# Patient Record
Sex: MALE | Race: WHITE | NOT HISPANIC OR LATINO | URBAN - METROPOLITAN AREA
[De-identification: names, ages, dates, MRNs, and addresses within clinical notes are randomized per-mention and may not be internally consistent; named-entity substitution may affect disease eponyms.]

---

## 2024-04-29 PROBLEM — I10 ESSENTIAL HYPERTENSION: Status: ACTIVE | Noted: 2024-04-29

## 2024-04-29 PROBLEM — E78.5 DYSLIPIDEMIA (HIGH LDL; LOW HDL): Status: ACTIVE | Noted: 2024-04-29

## 2024-04-29 PROBLEM — R20.9 SENSATION ALTERATION, LATE EFFECT OF CEREBROVASCULAR DISEASE: Status: ACTIVE | Noted: 2024-04-29

## 2024-04-29 PROBLEM — R73.03 PREDIABETES: Status: ACTIVE | Noted: 2024-04-29

## 2024-04-29 PROBLEM — I69.998 SENSATION ALTERATION, LATE EFFECT OF CEREBROVASCULAR DISEASE: Status: ACTIVE | Noted: 2024-04-29

## 2024-05-02 PROBLEM — E78.5 DYSLIPIDEMIA: Status: ACTIVE | Noted: 2024-05-02

## 2024-05-02 PROBLEM — R73.03 PRE-DIABETES: Status: ACTIVE | Noted: 2024-05-02

## 2024-05-02 PROBLEM — Z86.73 HISTORY OF STROKE: Status: ACTIVE | Noted: 2024-05-02

## 2024-05-02 PROBLEM — Z87.891 FORMER CIGARETTE SMOKER: Status: ACTIVE | Noted: 2024-05-02

## 2024-05-02 PROBLEM — I65.22 STENOSIS OF LEFT CAROTID ARTERY: Status: ACTIVE | Noted: 2024-05-02

## 2024-05-02 PROBLEM — I10 PRIMARY HYPERTENSION: Status: ACTIVE | Noted: 2024-05-02

## 2024-06-13 ENCOUNTER — TELEMEDICINE (OUTPATIENT)
Dept: NEUROLOGY | Facility: CLINIC | Age: 69
End: 2024-06-13
Payer: COMMERCIAL

## 2024-06-13 DIAGNOSIS — I10 ESSENTIAL HYPERTENSION: ICD-10-CM

## 2024-06-13 DIAGNOSIS — I69.998 SENSATION ALTERATION, LATE EFFECT OF CEREBROVASCULAR DISEASE: Primary | ICD-10-CM

## 2024-06-13 DIAGNOSIS — R73.03 PREDIABETES: ICD-10-CM

## 2024-06-13 DIAGNOSIS — I65.22 STENOSIS OF LEFT CAROTID ARTERY: ICD-10-CM

## 2024-06-13 DIAGNOSIS — E78.5 DYSLIPIDEMIA (HIGH LDL; LOW HDL): ICD-10-CM

## 2024-06-13 DIAGNOSIS — R20.9 SENSATION ALTERATION, LATE EFFECT OF CEREBROVASCULAR DISEASE: Primary | ICD-10-CM

## 2024-06-13 PROBLEM — M47.816 LUMBAR SPONDYLOSIS: Status: ACTIVE | Noted: 2020-08-06

## 2024-06-13 PROBLEM — I63.9 CEREBRAL INFARCTION (MULTI): Status: ACTIVE | Noted: 2024-06-13

## 2024-06-13 PROCEDURE — 1036F TOBACCO NON-USER: CPT | Performed by: PSYCHIATRY & NEUROLOGY

## 2024-06-13 PROCEDURE — 99214 OFFICE O/P EST MOD 30 MIN: CPT | Mod: 95 | Performed by: PSYCHIATRY & NEUROLOGY

## 2024-06-13 PROCEDURE — 1159F MED LIST DOCD IN RCRD: CPT | Performed by: PSYCHIATRY & NEUROLOGY

## 2024-06-13 PROCEDURE — 1160F RVW MEDS BY RX/DR IN RCRD: CPT | Performed by: PSYCHIATRY & NEUROLOGY

## 2024-06-13 PROCEDURE — 99214 OFFICE O/P EST MOD 30 MIN: CPT | Performed by: PSYCHIATRY & NEUROLOGY

## 2024-06-13 RX ORDER — AMLODIPINE BESYLATE 5 MG/1
5 TABLET ORAL DAILY
COMMUNITY

## 2024-06-13 RX ORDER — PRAVASTATIN SODIUM 10 MG/1
1 TABLET ORAL
COMMUNITY
Start: 2024-05-20

## 2024-06-13 NOTE — LETTER
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     Sincerely,     Deana Lee MD      CC: No Recipients  ______________________________________________________________________________________       [facility]   Irving Avalos is a 69 y.o. year old male presenting for Virtual Visit for follow-up .   PCP Dr Simental     8/2021- ED evaluation for LUE/face numbness progressing in setting of hypertensive emergency /85. Initial CT negative, admitted with persistent sx MRI dx stroke. Medications adjusted and DC to home.   Followup with Vascular Surgery for LICA stenosis, thought to be on low-end of velocity criteria given US and CTA. Plan to followup with US.      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 No recurrent events. Compliant with Vascular risk factors- HTN- >200 on presentation- now 120-140 at home, HPL-  intolerant of some statin but significant dietary changes, pre- DM- A1c 6%- significant dietary changes and 20# weight loss with , regular alcohol- now 0-2 light beers not daily, remote smoker quit 1982. .     7/27/23- Stroke prevention clinic- needs updated letter for driving.   Today: no new events. Residual hand/ face numbness. Returned to piano. Since last visit, seen at [facility] in 10/2022 for asymptomatic HTN, medications adjusted.   Vascular risk factors- HTN- 130/60 range, HPL, pre-DM, remote smoking.  Placed order for followup carotid US.  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      6/13/24- reschedule of 5/2/24 noshow.  Needs letter for driving a bus.  No new stroke events with minor residual numbness R face/ hand, back to all house activities,  and had returned to piano but dexterity for complex pieces are not the same.   Recently established care with PCP at [facility]. Vascular risk factors- HTN with office BP  last month 120-140s.  CKD Cr 1.34, HPL-back on low-dose statin after earlier intolerance and baseline , pre-DM with recent nonfasting glucose 191, BMI >30, remote smoking.    Extensive review of notes in EMR, labs, tests, Interpretation of neuroimaging  2021- TTE-normal EF and valves.   8/28/21- CT no acute findngs, CTA- atherosclerotic changes < 50% carotid stenosis.   8/31/21- MRI DWI + for R thalamic and occipital infarcts, microangiopathic white matter changes, MRA intracranial- R PCA stenosis, Car US-atherosclerotic changes moderate LICA stenosis.     Relevant ROS, Problem list, Past Medical/ Surgical/ Family/ Social history- reviewed and pertinent details noted in history.     Objective    Visit Vitals  Smoking Status Former   Normal speech and language, normal facial strength and animation.  No UE drift or evident clumsiness on finger movements.        Assessment/Plan  1. Sensation alteration, late effect of cerebrovascular disease    2. Essential hypertension    3. Dyslipidemia (high LDL; low HDL)    4. Prediabetes    5. Stenosis of left carotid artery        PLAN   R thalamic, occipital infarct (9/2021) with RPCA intracranial stenosis. Residual minor sensory changes.   Plan: Continue aspirin for stroke prevention.   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     Vascular risk factors- HTN, HPL, pre- DM- A1c 6%, asymptomatic L carotid stenosis.   Followup with PCP for ongoing monitoring and management.  Recommmend q1-2yr surveillance car US for asymptomatic LICA stenosis.        No orders of the defined types were placed in this encounter.

## 2024-06-13 NOTE — PROGRESS NOTES
[facility]   Irving Avalos is a 69 y.o. year old male presenting for Virtual Visit for follow-up .   PCP Dr Simental     8/2021- ED evaluation for LUE/face numbness progressing in setting of hypertensive emergency /85. Initial CT negative, admitted with persistent sx MRI dx stroke. Medications adjusted and DC to home.   Followup with Vascular Surgery for LICA stenosis, thought to be on low-end of velocity criteria given US and CTA. Plan to followup with US.      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 No recurrent events. Compliant with Vascular risk factors- HTN- >200 on presentation- now 120-140 at home, HPL-  intolerant of some statin but significant dietary changes, pre- DM- A1c 6%- significant dietary changes and 20# weight loss with , regular alcohol- now 0-2 light beers not daily, remote smoker quit 1982. .     7/27/23- Stroke prevention clinic- needs updated letter for driving.   Today: no new events. Residual hand/ face numbness. Returned to piano. Since last visit, seen at [facility] in 10/2022 for asymptomatic HTN, medications adjusted.   Vascular risk factors- HTN- 130/60 range, HPL, pre-DM, remote smoking.  Placed order for followup carotid US.  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      6/13/24- reschedule of 5/2/24 noshow.  Needs letter for driving a bus.  No new stroke events with minor residual numbness R face/ hand, back to all house activities, and had returned to piano but dexterity for complex pieces are not the same.   Recently established care with PCP at [facility]. Vascular risk factors- HTN with office BP  last month 120-140s.  CKD Cr 1.34, HPL-back on low-dose statin after earlier intolerance and baseline , pre-DM with recent nonfasting glucose 191, BMI >30, remote smoking.    Extensive review of notes in EMR,  labs, tests, Interpretation of neuroimaging  2021- TTE-normal EF and valves.   8/28/21- CT no acute findngs, CTA- atherosclerotic changes < 50% carotid stenosis.   8/31/21- MRI DWI + for R thalamic and occipital infarcts, microangiopathic white matter changes, MRA intracranial- R PCA stenosis, Car US-atherosclerotic changes moderate LICA stenosis.     Relevant ROS, Problem list, Past Medical/ Surgical/ Family/ Social history- reviewed and pertinent details noted in history.     Objective     Visit Vitals  Smoking Status Former   Normal speech and language, normal facial strength and animation.  No UE drift or evident clumsiness on finger movements.        Assessment/Plan   1. Sensation alteration, late effect of cerebrovascular disease    2. Essential hypertension    3. Dyslipidemia (high LDL; low HDL)    4. Prediabetes    5. Stenosis of left carotid artery        PLAN   R thalamic, occipital infarct (9/2021) with RPCA intracranial stenosis. Residual minor sensory changes.   Plan: Continue aspirin for stroke prevention.   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     Vascular risk factors- HTN, HPL, pre- DM- A1c 6%, asymptomatic L carotid stenosis.   Followup with PCP for ongoing monitoring and management.  Recommmend q1-2yr surveillance car US for asymptomatic LICA stenosis.        No orders of the defined types were placed in this encounter.